# Patient Record
Sex: MALE | Race: WHITE
[De-identification: names, ages, dates, MRNs, and addresses within clinical notes are randomized per-mention and may not be internally consistent; named-entity substitution may affect disease eponyms.]

---

## 2021-01-18 ENCOUNTER — HOSPITAL ENCOUNTER (INPATIENT)
Dept: HOSPITAL 19 - COL.ER | Age: 61
LOS: 3 days | Discharge: HOME | DRG: 915 | End: 2021-01-21
Attending: INTERNAL MEDICINE | Admitting: INTERNAL MEDICINE
Payer: COMMERCIAL

## 2021-01-18 VITALS — OXYGEN SATURATION: 100 %

## 2021-01-18 VITALS — OXYGEN SATURATION: 99 %

## 2021-01-18 VITALS — TEMPERATURE: 97.6 F | HEART RATE: 52 BPM | SYSTOLIC BLOOD PRESSURE: 104 MMHG | DIASTOLIC BLOOD PRESSURE: 64 MMHG

## 2021-01-18 VITALS — HEIGHT: 70 IN | BODY MASS INDEX: 25.72 KG/M2 | WEIGHT: 179.68 LBS

## 2021-01-18 VITALS — OXYGEN SATURATION: 98 %

## 2021-01-18 VITALS — HEART RATE: 68 BPM | SYSTOLIC BLOOD PRESSURE: 133 MMHG | TEMPERATURE: 96.7 F | DIASTOLIC BLOOD PRESSURE: 86 MMHG

## 2021-01-18 VITALS — TEMPERATURE: 97.8 F | DIASTOLIC BLOOD PRESSURE: 84 MMHG | HEART RATE: 55 BPM | SYSTOLIC BLOOD PRESSURE: 133 MMHG

## 2021-01-18 DIAGNOSIS — T78.3XXA: Primary | ICD-10-CM

## 2021-01-18 DIAGNOSIS — E87.1: ICD-10-CM

## 2021-01-18 DIAGNOSIS — R73.9: ICD-10-CM

## 2021-01-18 DIAGNOSIS — J96.01: ICD-10-CM

## 2021-01-18 DIAGNOSIS — I10: ICD-10-CM

## 2021-01-18 DIAGNOSIS — E78.5: ICD-10-CM

## 2021-01-18 DIAGNOSIS — F32.9: ICD-10-CM

## 2021-01-18 DIAGNOSIS — T38.0X5A: ICD-10-CM

## 2021-01-18 LAB
ANION GAP SERPL CALC-SCNC: 12 MMOL/L (ref 7–16)
BASE EXCESS BLDA CALC-SCNC: -0.4 MMOL/L (ref -2–2)
BASOPHILS # BLD: 0.1 10*3/UL (ref 0–0.2)
BASOPHILS NFR BLD AUTO: 1 % (ref 0–2)
BUN SERPL-MCNC: 15 MG/DL (ref 9–20)
CALCIUM SERPL-MCNC: 9.9 MG/DL (ref 8.4–10.2)
CHLORIDE SERPL-SCNC: 91 MMOL/L (ref 98–107)
CO2 BLDA-SCNC: 23.8 MMOL/L
CO2 SERPL-SCNC: 28 MMOL/L (ref 22–30)
CREAT SERPL-SCNC: 0.85 UMOL/L (ref 0.66–1.25)
EOSINOPHIL # BLD: 0.1 10*3/UL (ref 0–0.7)
EOSINOPHIL NFR BLD: 1.5 % (ref 0–4)
ERYTHROCYTE [DISTWIDTH] IN BLOOD BY AUTOMATED COUNT: 12.4 % (ref 11.5–14.5)
GLUCOSE SERPL-MCNC: 127 MG/DL (ref 74–106)
GRANULOCYTES # BLD AUTO: 61.8 % (ref 42.2–75.2)
HCO3 BLDA-SCNC: 22.8 MEQ/L (ref 22–26)
HCT VFR BLD AUTO: 40.7 % (ref 42–52)
HGB BLD-MCNC: 14.6 G/DL (ref 13.5–18)
INHALED O2 CONCENTRATION: 30 %
KETONES UR STRIP.AUTO-MCNC: (no result) MG/DL
LYMPHOCYTES # BLD: 1.4 10*3/UL (ref 1.2–3.4)
LYMPHOCYTES NFR BLD: 23.9 % (ref 20–51)
MCH RBC QN AUTO: 34 PG (ref 27–31)
MCHC RBC AUTO-ENTMCNC: 36 G/DL (ref 33–37)
MCV RBC AUTO: 94 FL (ref 80–100)
MONOCYTES # BLD: 0.7 10*3/UL (ref 0.1–0.6)
MONOCYTES NFR BLD AUTO: 11.5 % (ref 1.7–9.3)
NEUTROPHILS # BLD: 3.7 10*3/UL (ref 1.4–6.5)
PCO2 BLDA: 33.2 MMHG (ref 35–45)
PH UR STRIP.AUTO: 5 [PH] (ref 5–8)
PLATELET # BLD AUTO: 284 K/MM3 (ref 130–400)
PMV BLD AUTO: 8.4 FL (ref 7.4–10.4)
PO2 BLDA: 133.5 MMHG (ref 80–100)
POTASSIUM SERPL-SCNC: 3.9 MMOL/L (ref 3.4–5)
RBC # BLD AUTO: 4.35 M/MM3 (ref 4.2–5.6)
RBC # UR: (no result) /HPF
SAO2 % BLDA: 98.5 % (ref 92–100)
SODIUM SERPL-SCNC: 131 MMOL/L (ref 137–145)
SP GR UR STRIP.AUTO: 1.02 (ref 1–1.03)
URN COLLECT METHOD CLASS: (no result)

## 2021-01-18 PROCEDURE — C1892 INTRO/SHEATH,FIXED,PEEL-AWAY: HCPCS

## 2021-01-18 PROCEDURE — C1751 CATH, INF, PER/CENT/MIDLINE: HCPCS

## 2021-01-18 NOTE — NUR
PT INTUBATED AT 1656, 7.0ETT, 24CM @ LIP. INITIAL SETTINGS, , RR 18,
I-TIME 1, PEEP 5. DIFFICULT INTUBATION WITH 3RD ATTEMPT BEING SUCCESSFUL. PT
PLACED ON VENTILATOR AND AWAITING TRANSPORT TO ICU.

## 2021-01-19 VITALS — OXYGEN SATURATION: 100 %

## 2021-01-19 VITALS — OXYGEN SATURATION: 97 %

## 2021-01-19 VITALS — OXYGEN SATURATION: 99 %

## 2021-01-19 VITALS — OXYGEN SATURATION: 98 %

## 2021-01-19 VITALS — OXYGEN SATURATION: 98 % | DIASTOLIC BLOOD PRESSURE: 64 MMHG | HEART RATE: 54 BPM | SYSTOLIC BLOOD PRESSURE: 103 MMHG

## 2021-01-19 VITALS — SYSTOLIC BLOOD PRESSURE: 109 MMHG | OXYGEN SATURATION: 98 % | DIASTOLIC BLOOD PRESSURE: 64 MMHG | HEART RATE: 57 BPM

## 2021-01-19 VITALS
OXYGEN SATURATION: 98 % | DIASTOLIC BLOOD PRESSURE: 61 MMHG | SYSTOLIC BLOOD PRESSURE: 103 MMHG | HEART RATE: 65 BPM | TEMPERATURE: 97.8 F

## 2021-01-19 VITALS — OXYGEN SATURATION: 86 %

## 2021-01-19 VITALS — DIASTOLIC BLOOD PRESSURE: 61 MMHG | HEART RATE: 52 BPM | SYSTOLIC BLOOD PRESSURE: 96 MMHG

## 2021-01-19 VITALS — OXYGEN SATURATION: 92 %

## 2021-01-19 VITALS — TEMPERATURE: 97.8 F | HEART RATE: 59 BPM | SYSTOLIC BLOOD PRESSURE: 123 MMHG | DIASTOLIC BLOOD PRESSURE: 65 MMHG

## 2021-01-19 VITALS
OXYGEN SATURATION: 99 % | HEART RATE: 52 BPM | DIASTOLIC BLOOD PRESSURE: 65 MMHG | TEMPERATURE: 97.6 F | SYSTOLIC BLOOD PRESSURE: 107 MMHG

## 2021-01-19 VITALS — HEART RATE: 55 BPM | TEMPERATURE: 97.3 F | DIASTOLIC BLOOD PRESSURE: 64 MMHG | SYSTOLIC BLOOD PRESSURE: 107 MMHG

## 2021-01-19 VITALS — DIASTOLIC BLOOD PRESSURE: 67 MMHG | HEART RATE: 52 BPM | OXYGEN SATURATION: 100 % | SYSTOLIC BLOOD PRESSURE: 114 MMHG

## 2021-01-19 VITALS — OXYGEN SATURATION: 96 %

## 2021-01-19 LAB
ANION GAP SERPL CALC-SCNC: 9 MMOL/L (ref 7–16)
BASE EXCESS BLDA CALC-SCNC: 0.4 MMOL/L (ref -2–2)
BASOPHILS # BLD: 0 10*3/UL (ref 0–0.2)
BASOPHILS NFR BLD AUTO: 0.3 % (ref 0–2)
BUN SERPL-MCNC: 12 MG/DL (ref 9–20)
CALCIUM SERPL-MCNC: 8.7 MG/DL (ref 8.4–10.2)
CHLORIDE SERPL-SCNC: 98 MMOL/L (ref 98–107)
CO2 BLDA-SCNC: 25.7 MMOL/L
CO2 SERPL-SCNC: 24 MMOL/L (ref 22–30)
CREAT SERPL-SCNC: 0.56 UMOL/L (ref 0.66–1.25)
EOSINOPHIL # BLD: 0 10*3/UL (ref 0–0.7)
EOSINOPHIL NFR BLD: 0 % (ref 0–4)
ERYTHROCYTE [DISTWIDTH] IN BLOOD BY AUTOMATED COUNT: 12.6 % (ref 11.5–14.5)
GLUCOSE SERPL-MCNC: 141 MG/DL (ref 74–106)
GRANULOCYTES # BLD AUTO: 88.7 % (ref 42.2–75.2)
HCO3 BLDA-SCNC: 24.5 MEQ/L (ref 22–26)
HCT VFR BLD AUTO: 36.9 % (ref 42–52)
HGB BLD-MCNC: 12.9 G/DL (ref 13.5–18)
INHALED O2 CONCENTRATION: 30 %
LYMPHOCYTES # BLD: 0.3 10*3/UL (ref 1.2–3.4)
LYMPHOCYTES NFR BLD: 8.5 % (ref 20–51)
MAGNESIUM SERPL-MCNC: 2.1 MG/DL (ref 1.6–2.3)
MCH RBC QN AUTO: 34 PG (ref 27–31)
MCHC RBC AUTO-ENTMCNC: 35 G/DL (ref 33–37)
MCV RBC AUTO: 96 FL (ref 80–100)
MONOCYTES # BLD: 0.1 10*3/UL (ref 0.1–0.6)
MONOCYTES NFR BLD AUTO: 2 % (ref 1.7–9.3)
NEUTROPHILS # BLD: 3.5 10*3/UL (ref 1.4–6.5)
PCO2 BLDA: 37.9 MMHG (ref 35–45)
PHOSPHATE SERPL-MCNC: 3.9 MG/DL (ref 2.5–4.5)
PLATELET # BLD AUTO: 211 K/MM3 (ref 130–400)
PMV BLD AUTO: 8.4 FL (ref 7.4–10.4)
PO2 BLDA: 125.3 MMHG (ref 80–100)
POTASSIUM SERPL-SCNC: 4 MMOL/L (ref 3.4–5)
RBC # BLD AUTO: 3.85 M/MM3 (ref 4.2–5.6)
SAO2 % BLDA: 98.5 % (ref 92–100)
SODIUM SERPL-SCNC: 131 MMOL/L (ref 137–145)

## 2021-01-19 PROCEDURE — 02HV33Z INSERTION OF INFUSION DEVICE INTO SUPERIOR VENA CAVA, PERCUTANEOUS APPROACH: ICD-10-PCS

## 2021-01-19 NOTE — NUR
PT IS NOT ON WEAN TRIAL AS HE DOES NOT QUALIFY PT ON DOCUMENTED SETTINGS SAKINA
WELL WITH NO DISTRESS NOTED AT THIS TIME

## 2021-01-19 NOTE — NUR
PT CURRENTLY RESTING IN BED INTUBATED AND
SEDATED ON PROPOFOL AND FENTANYL FOR PAIN
MANAGEMENT. VSS AT THIS TIME. RESTRAINS AND BARKLEY REMAIN IN PLACE. PT
TOLERATING THE VENT AND SEDATION VACTION WILL BE CONDUCTED TOWARDS THE MORNING
FOR POTENTIAL EXTUBATIONS TOMORROW. WILL CONTINUE TO MONITOR PT STATUS AND
UPDATE PROVIDERS WITH ANY CHANGES.

## 2021-01-19 NOTE — NUR
Sw made contact with the patient's Son  Osmin Rios. [phone]. Son
reports that the patient resides locally in town but denies knowing his
medical information or where he obtains medications. Son reports that the
patient was driving to [facility] to see a physician but does not
know who. Son also reports that he was drving to [facility] to obtain
medications, will attempt to gather more information from the patient. Patient
currently getting picc placement antoinette mario. Patient is able to use
cell phone and texted on it.

## 2021-01-19 NOTE — NUR
Received report from MIN Jorge. Patient resting quietly in bed. Vitals within
normal limits. Patient denies any pain at this time. Will continue to monitor.

## 2021-01-19 NOTE — NUR
PT AWAKE TO VOICE, F/C, ABLE TO SHAKE HEAD NO TO PAIN,  ANSWERS QUESTIONS
APPROPRIATELY. TEACHING RE; VENT, FOLY, RESTRAINS, AND CURRENT MEDICATION
CONDUCTED AND PT ABLE TO ACKLOWLEDGE UNDERSTANDING. WILL CONTINUE TO MONITOR
PT STATUS AND UPDATE PROVIDERS AS NEENED.

## 2021-01-20 VITALS — OXYGEN SATURATION: 99 %

## 2021-01-20 VITALS — OXYGEN SATURATION: 100 %

## 2021-01-20 VITALS — OXYGEN SATURATION: 98 %

## 2021-01-20 VITALS — OXYGEN SATURATION: 97 %

## 2021-01-20 VITALS — OXYGEN SATURATION: 95 %

## 2021-01-20 VITALS
OXYGEN SATURATION: 99 % | DIASTOLIC BLOOD PRESSURE: 79 MMHG | TEMPERATURE: 98.3 F | HEART RATE: 66 BPM | SYSTOLIC BLOOD PRESSURE: 149 MMHG

## 2021-01-20 VITALS — OXYGEN SATURATION: 87 %

## 2021-01-20 VITALS — DIASTOLIC BLOOD PRESSURE: 67 MMHG | SYSTOLIC BLOOD PRESSURE: 122 MMHG | HEART RATE: 49 BPM | TEMPERATURE: 97.1 F

## 2021-01-20 VITALS — SYSTOLIC BLOOD PRESSURE: 132 MMHG | DIASTOLIC BLOOD PRESSURE: 75 MMHG | TEMPERATURE: 98.6 F | HEART RATE: 101 BPM

## 2021-01-20 VITALS — OXYGEN SATURATION: 96 %

## 2021-01-20 VITALS — OXYGEN SATURATION: 92 %

## 2021-01-20 VITALS — OXYGEN SATURATION: 90 %

## 2021-01-20 VITALS — OXYGEN SATURATION: 68 %

## 2021-01-20 VITALS — DIASTOLIC BLOOD PRESSURE: 652 MMHG | SYSTOLIC BLOOD PRESSURE: 109 MMHG | HEART RATE: 49 BPM | TEMPERATURE: 97.1 F

## 2021-01-20 VITALS — OXYGEN SATURATION: 85 %

## 2021-01-20 VITALS — OXYGEN SATURATION: 93 %

## 2021-01-20 VITALS — OXYGEN SATURATION: 75 %

## 2021-01-20 VITALS — DIASTOLIC BLOOD PRESSURE: 81 MMHG | SYSTOLIC BLOOD PRESSURE: 140 MMHG | TEMPERATURE: 98.1 F | HEART RATE: 69 BPM

## 2021-01-20 VITALS — OXYGEN SATURATION: 94 %

## 2021-01-20 VITALS — OXYGEN SATURATION: 91 %

## 2021-01-20 VITALS — OXYGEN SATURATION: 81 %

## 2021-01-20 VITALS — OXYGEN SATURATION: 84 %

## 2021-01-20 VITALS — OXYGEN SATURATION: 88 %

## 2021-01-20 VITALS — OXYGEN SATURATION: 69 %

## 2021-01-20 VITALS — DIASTOLIC BLOOD PRESSURE: 65 MMHG | SYSTOLIC BLOOD PRESSURE: 114 MMHG | HEART RATE: 61 BPM

## 2021-01-20 LAB
ANION GAP SERPL CALC-SCNC: 6 MMOL/L (ref 7–16)
BASE EXCESS BLDA CALC-SCNC: 6.2 MMOL/L (ref -2–2)
BUN SERPL-MCNC: 14 MG/DL (ref 9–20)
CALCIUM SERPL-MCNC: 8.9 MG/DL (ref 8.4–10.2)
CHLORIDE SERPL-SCNC: 98 MMOL/L (ref 98–107)
CO2 BLDA-SCNC: 32.5 MMOL/L
CO2 SERPL-SCNC: 29 MMOL/L (ref 22–30)
CREAT SERPL-SCNC: 0.55 UMOL/L (ref 0.66–1.25)
ERYTHROCYTE [DISTWIDTH] IN BLOOD BY AUTOMATED COUNT: 13.2 % (ref 11.5–14.5)
GLUCOSE SERPL-MCNC: 182 MG/DL (ref 74–106)
HCO3 BLDA-SCNC: 31.1 MEQ/L (ref 22–26)
HCT VFR BLD AUTO: 35 % (ref 42–52)
HGB BLD-MCNC: 12 G/DL (ref 13.5–18)
INHALED O2 CONCENTRATION: 30 %
LYMPHOCYTES NFR BLD MANUAL: 3 % (ref 20–51)
MCH RBC QN AUTO: 34 PG (ref 27–31)
MCHC RBC AUTO-ENTMCNC: 34 G/DL (ref 33–37)
MCV RBC AUTO: 99 FL (ref 80–100)
MONOCYTES NFR BLD: 2 % (ref 1.7–9.3)
NEUTS BAND NFR BLD: 4 % (ref 0–10)
NEUTS SEG NFR BLD MANUAL: 91 % (ref 42–75.2)
PCO2 BLDA: 45.8 MMHG (ref 35–45)
PLATELET # BLD AUTO: 210 K/MM3 (ref 130–400)
PLATELET BLD QL SMEAR: NORMAL
PMV BLD AUTO: 8.9 FL (ref 7.4–10.4)
PO2 BLDA: 119.3 MMHG (ref 80–100)
POTASSIUM SERPL-SCNC: 3.8 MMOL/L (ref 3.4–5)
RBC # BLD AUTO: 3.53 M/MM3 (ref 4.2–5.6)
SAO2 % BLDA: 98.3 % (ref 92–100)
SODIUM SERPL-SCNC: 134 MMOL/L (ref 137–145)

## 2021-01-20 NOTE — NUR
On sedation, patient is alert and following all verbal commands. Patient opens
eyes spontaneously and is able to answer yes/no questions appropriately by
shaking or nodding his head. No sedation vacation performed at this time.

## 2021-01-20 NOTE — NUR
PT EXTUBATED TO ROOM AIR, SOME SWELLING OF THE TONGUE AND MOUTH STIOLL
PRESENT, HOWEVER, SUBSTANTIALLY LESS THEN PT PRESENTED WITH IN THE ED. NO
COMPLAINTS OF SHORTNESS OF BREATH OR DIFFICULTY BREATHING.

## 2021-01-21 VITALS — DIASTOLIC BLOOD PRESSURE: 75 MMHG | HEART RATE: 67 BPM | TEMPERATURE: 97.6 F | SYSTOLIC BLOOD PRESSURE: 135 MMHG

## 2021-01-21 VITALS — OXYGEN SATURATION: 95 %

## 2021-01-21 VITALS — OXYGEN SATURATION: 96 %

## 2021-01-21 VITALS — OXYGEN SATURATION: 98 %

## 2021-01-21 VITALS — OXYGEN SATURATION: 94 %

## 2021-01-21 VITALS — OXYGEN SATURATION: 97 %

## 2021-01-21 VITALS — OXYGEN SATURATION: 99 %

## 2021-01-21 VITALS — DIASTOLIC BLOOD PRESSURE: 73 MMHG | SYSTOLIC BLOOD PRESSURE: 131 MMHG | TEMPERATURE: 98 F | HEART RATE: 69 BPM

## 2021-01-21 VITALS — OXYGEN SATURATION: 91 %

## 2021-01-21 VITALS — OXYGEN SATURATION: 93 %

## 2021-01-21 VITALS
OXYGEN SATURATION: 99 % | HEART RATE: 78 BPM | DIASTOLIC BLOOD PRESSURE: 76 MMHG | TEMPERATURE: 98 F | SYSTOLIC BLOOD PRESSURE: 141 MMHG

## 2021-01-21 VITALS — OXYGEN SATURATION: 100 %

## 2021-01-21 VITALS — OXYGEN SATURATION: 74 %

## 2021-01-21 PROCEDURE — 0BH17EZ INSERTION OF ENDOTRACHEAL AIRWAY INTO TRACHEA, VIA NATURAL OR ARTIFICIAL OPENING: ICD-10-PCS | Performed by: INTERNAL MEDICINE

## 2021-01-21 PROCEDURE — 5A1935Z RESPIRATORY VENTILATION, LESS THAN 24 CONSECUTIVE HOURS: ICD-10-PCS | Performed by: INTERNAL MEDICINE

## 2021-01-21 NOTE — NUR
Assessment complete. Pt is AXO X3, denies having any pain at this time. KIZZY
PICC line has good blood return and remains free of complications. Pt is
resting quietly in the bed at this time and he denies further needs. Call
light within reach.

## 2021-01-21 NOTE — NUR
Patient discharging home, discussed discharge orders and instructions,
isntructed to follow up with PCP in 1 week/ he sees a Doc in [place] as
that is where he lived and said he was going to work on getting set up with a
new PCP here in [place] as soon as he got home today, PICC removed by [facility],
discussed meds with patient, scritps for pepcid/benadry/prednisone/albuterol
sent to pharmacy for him and education provided, instructed to discontinue
taking Lisionpril at home in any capacity/ he verbalized understanding of
tina, leaving alone and driving himeself home, I escorted him out the
door